# Patient Record
Sex: FEMALE | Race: WHITE | NOT HISPANIC OR LATINO
[De-identification: names, ages, dates, MRNs, and addresses within clinical notes are randomized per-mention and may not be internally consistent; named-entity substitution may affect disease eponyms.]

---

## 2018-02-13 ENCOUNTER — APPOINTMENT (OUTPATIENT)
Dept: ORTHOPEDIC SURGERY | Facility: CLINIC | Age: 63
End: 2018-02-13
Payer: COMMERCIAL

## 2018-02-13 VITALS
WEIGHT: 180 LBS | DIASTOLIC BLOOD PRESSURE: 85 MMHG | BODY MASS INDEX: 31.89 KG/M2 | HEART RATE: 84 BPM | HEIGHT: 63 IN | SYSTOLIC BLOOD PRESSURE: 130 MMHG

## 2018-02-13 DIAGNOSIS — Z96.643 PRESENCE OF ARTIFICIAL HIP JOINT, BILATERAL: ICD-10-CM

## 2018-02-13 PROCEDURE — 99204 OFFICE O/P NEW MOD 45 MIN: CPT

## 2018-02-13 PROCEDURE — 73564 X-RAY EXAM KNEE 4 OR MORE: CPT | Mod: 50

## 2018-02-13 PROCEDURE — 73522 X-RAY EXAM HIPS BI 3-4 VIEWS: CPT

## 2018-02-13 RX ORDER — ESCITALOPRAM OXALATE 10 MG/1
10 TABLET ORAL
Qty: 30 | Refills: 0 | Status: ACTIVE | COMMUNITY
Start: 2017-09-26

## 2018-02-13 RX ORDER — ALBUTEROL SULFATE 90 UG/1
108 (90 BASE) POWDER, METERED RESPIRATORY (INHALATION)
Qty: 1 | Refills: 0 | Status: ACTIVE | COMMUNITY
Start: 2017-09-24

## 2018-02-13 RX ORDER — AZITHROMYCIN 250 MG/1
250 TABLET, FILM COATED ORAL
Qty: 6 | Refills: 0 | Status: ACTIVE | COMMUNITY
Start: 2017-12-15

## 2018-02-13 RX ORDER — BENZONATATE 200 MG/1
200 CAPSULE ORAL
Qty: 15 | Refills: 0 | Status: ACTIVE | COMMUNITY
Start: 2017-09-24

## 2018-02-13 RX ORDER — AMOXICILLIN 875 MG/1
875 TABLET, FILM COATED ORAL
Qty: 20 | Refills: 0 | Status: ACTIVE | COMMUNITY
Start: 2017-09-28

## 2018-02-13 RX ORDER — FLUTICASONE PROPIONATE 50 UG/1
50 SPRAY, METERED NASAL
Qty: 16 | Refills: 0 | Status: ACTIVE | COMMUNITY
Start: 2017-08-26

## 2018-02-13 RX ORDER — ONABOTULINUMTOXINA 100 [USP'U]/1
100 INJECTION, POWDER, LYOPHILIZED, FOR SOLUTION INTRADERMAL; INTRAMUSCULAR
Qty: 1 | Refills: 0 | Status: ACTIVE | COMMUNITY
Start: 2017-10-16

## 2018-02-21 ENCOUNTER — APPOINTMENT (OUTPATIENT)
Dept: OPHTHALMOLOGY | Facility: CLINIC | Age: 63
End: 2018-02-21

## 2018-07-09 ENCOUNTER — APPOINTMENT (OUTPATIENT)
Dept: OPHTHALMOLOGY | Facility: CLINIC | Age: 63
End: 2018-07-09
Payer: COMMERCIAL

## 2018-07-09 DIAGNOSIS — H26.9 UNSPECIFIED CATARACT: ICD-10-CM

## 2018-07-09 DIAGNOSIS — H04.123 DRY EYE SYNDROME OF BILATERAL LACRIMAL GLANDS: ICD-10-CM

## 2018-07-09 DIAGNOSIS — G51.3 CLONIC HEMIFACIAL SPASM: ICD-10-CM

## 2018-07-09 PROCEDURE — 92004 COMPRE OPH EXAM NEW PT 1/>: CPT

## 2018-10-28 ENCOUNTER — TRANSCRIPTION ENCOUNTER (OUTPATIENT)
Age: 63
End: 2018-10-28

## 2019-03-19 ENCOUNTER — APPOINTMENT (OUTPATIENT)
Dept: ORTHOPEDIC SURGERY | Facility: CLINIC | Age: 64
End: 2019-03-19
Payer: COMMERCIAL

## 2019-03-19 DIAGNOSIS — M54.5 LOW BACK PAIN: ICD-10-CM

## 2019-03-19 PROCEDURE — 72100 X-RAY EXAM L-S SPINE 2/3 VWS: CPT

## 2019-03-19 PROCEDURE — 99213 OFFICE O/P EST LOW 20 MIN: CPT

## 2019-03-19 PROCEDURE — 73522 X-RAY EXAM HIPS BI 3-4 VIEWS: CPT

## 2019-03-19 NOTE — HISTORY OF PRESENT ILLNESS
[de-identified] : Pt is a 62 y/o female s/p R THR 2007 and L THR 2008 which were doing very well.  She states that she recently started to exercise regularly.  She tried using a rowing machine but stopped so that she could make sure she was not damaging her hips.\par She notes that she was running to catch a train about 1 month ago in the snow while holding a suitcase.  She had to step up to get on a train and afterwards she started to feel pain in the right hip.  She has intermittent pain when she stands for a long period of time.  The pain occasionally radiates down both legs.  She takes Advil for pain at night.

## 2019-03-19 NOTE — DISCUSSION/SUMMARY
[de-identified] : She is doing extremely well with her hip replacements.  She did have cobalt chrome and nickel levels done in May 2018.  The nickel was normal and there is only slight elevation in the cobalt and chromium which is very common with hip replacements.  At this time we will just follow her conservatively.  She will follow conservative back measures at this time for her back.  She is walking very well and function is excellent.  Return visit in 1 year.

## 2019-03-19 NOTE — PHYSICAL EXAM
[de-identified] : \par HIP EXAMINATION this patient is doing extremely well and her motion is excellent.  She has symmetric motion with flexion of 135, abduction 85, abduction 30 external rotation 80 and internal rotation 20.  She is walking very well.  She has bilateral Biomet Magnum metal-on-metal total hip replacements.  She feels strong and is walking well.\par \par He recently had some discomfort but was on her buttocks high up on the right consistent with low back pain it was after she was using some of the machines and working out.  Her deep tendon reflexes motor and sensory are symmetric.  She has good strength in all areas.  But clinically she does have some low back discomfort radiating to the top of the right buttocks and ilium. [de-identified] : Today an AP of the pelvis and lateral of the right and left hips were repeated.  She has bilateral metal-on-metal total hip replacement. They're in good position there are well fixed there is no evidence of any localized ostial lysis.\par \par An x-ray of her lumbar spine shows a slight list to the left and the L3 vertebrae hip slightly on L4 with degenerative changes and disc degeneration at L3-4 and L4-5.  Impression degenerative disc and lower lumbar spine.

## 2019-05-21 ENCOUNTER — APPOINTMENT (OUTPATIENT)
Dept: OPHTHALMOLOGY | Facility: CLINIC | Age: 64
End: 2019-05-21
Payer: COMMERCIAL

## 2019-05-21 PROCEDURE — 92083 EXTENDED VISUAL FIELD XM: CPT | Mod: NC

## 2019-05-21 PROCEDURE — 92012 INTRM OPH EXAM EST PATIENT: CPT

## 2019-05-28 ENCOUNTER — APPOINTMENT (OUTPATIENT)
Dept: OPHTHALMOLOGY | Facility: CLINIC | Age: 64
End: 2019-05-28
Payer: COMMERCIAL

## 2019-05-28 PROCEDURE — 64612 DESTROY NERVE FACE MUSCLE: CPT | Mod: RT

## 2019-08-02 ENCOUNTER — EMERGENCY (EMERGENCY)
Facility: HOSPITAL | Age: 64
LOS: 1 days | Discharge: HOME | End: 2019-08-02
Attending: EMERGENCY MEDICINE | Admitting: EMERGENCY MEDICINE
Payer: MEDICARE

## 2019-08-02 VITALS
HEART RATE: 79 BPM | RESPIRATION RATE: 15 BRPM | SYSTOLIC BLOOD PRESSURE: 119 MMHG | HEIGHT: 63 IN | DIASTOLIC BLOOD PRESSURE: 82 MMHG | WEIGHT: 175.05 LBS | OXYGEN SATURATION: 95 % | TEMPERATURE: 98 F

## 2019-08-02 VITALS
TEMPERATURE: 98 F | RESPIRATION RATE: 16 BRPM | SYSTOLIC BLOOD PRESSURE: 114 MMHG | OXYGEN SATURATION: 98 % | HEART RATE: 72 BPM | DIASTOLIC BLOOD PRESSURE: 66 MMHG

## 2019-08-02 LAB
ALBUMIN SERPL ELPH-MCNC: 3.4 G/DL — SIGNIFICANT CHANGE UP (ref 3.3–5)
ALP SERPL-CCNC: 68 U/L — SIGNIFICANT CHANGE UP (ref 40–120)
ALT FLD-CCNC: 17 U/L DA — SIGNIFICANT CHANGE UP (ref 10–45)
ANION GAP SERPL CALC-SCNC: 8 MMOL/L — SIGNIFICANT CHANGE UP (ref 5–17)
APTT BLD: 30.3 SEC — SIGNIFICANT CHANGE UP (ref 27.5–36.3)
AST SERPL-CCNC: 15 U/L — SIGNIFICANT CHANGE UP (ref 10–40)
BILIRUB SERPL-MCNC: 0.4 MG/DL — SIGNIFICANT CHANGE UP (ref 0.2–1.2)
BUN SERPL-MCNC: 29 MG/DL — HIGH (ref 7–23)
CALCIUM SERPL-MCNC: 9 MG/DL — SIGNIFICANT CHANGE UP (ref 8.4–10.5)
CHLORIDE SERPL-SCNC: 106 MMOL/L — SIGNIFICANT CHANGE UP (ref 96–108)
CO2 SERPL-SCNC: 27 MMOL/L — SIGNIFICANT CHANGE UP (ref 22–31)
CREAT SERPL-MCNC: 0.86 MG/DL — SIGNIFICANT CHANGE UP (ref 0.5–1.3)
GLUCOSE SERPL-MCNC: 99 MG/DL — SIGNIFICANT CHANGE UP (ref 70–99)
HCT VFR BLD CALC: 43.7 % — SIGNIFICANT CHANGE UP (ref 34.5–45)
HGB BLD-MCNC: 14.7 G/DL — SIGNIFICANT CHANGE UP (ref 11.5–15.5)
INR BLD: 0.94 RATIO — SIGNIFICANT CHANGE UP (ref 0.88–1.16)
LIDOCAIN IGE QN: 146 U/L — SIGNIFICANT CHANGE UP (ref 73–393)
MCHC RBC-ENTMCNC: 31.9 PG — SIGNIFICANT CHANGE UP (ref 27–34)
MCHC RBC-ENTMCNC: 33.6 GM/DL — SIGNIFICANT CHANGE UP (ref 32–36)
MCV RBC AUTO: 94.8 FL — SIGNIFICANT CHANGE UP (ref 80–100)
NRBC # BLD: 0 /100 WBCS — SIGNIFICANT CHANGE UP (ref 0–0)
NT-PROBNP SERPL-SCNC: 226 PG/ML — SIGNIFICANT CHANGE UP (ref 0–300)
PLATELET # BLD AUTO: 214 K/UL — SIGNIFICANT CHANGE UP (ref 150–400)
POTASSIUM SERPL-MCNC: 4.3 MMOL/L — SIGNIFICANT CHANGE UP (ref 3.5–5.3)
POTASSIUM SERPL-SCNC: 4.3 MMOL/L — SIGNIFICANT CHANGE UP (ref 3.5–5.3)
PROT SERPL-MCNC: 7.2 G/DL — SIGNIFICANT CHANGE UP (ref 6–8.3)
PROTHROM AB SERPL-ACNC: 10.5 SEC — SIGNIFICANT CHANGE UP (ref 10–12.9)
RBC # BLD: 4.61 M/UL — SIGNIFICANT CHANGE UP (ref 3.8–5.2)
RBC # FLD: 12.6 % — SIGNIFICANT CHANGE UP (ref 10.3–14.5)
SODIUM SERPL-SCNC: 141 MMOL/L — SIGNIFICANT CHANGE UP (ref 135–145)
TROPONIN I SERPL-MCNC: <.017 NG/ML — LOW (ref 0.02–0.06)
WBC # BLD: 5.09 K/UL — SIGNIFICANT CHANGE UP (ref 3.8–10.5)
WBC # FLD AUTO: 5.09 K/UL — SIGNIFICANT CHANGE UP (ref 3.8–10.5)

## 2019-08-02 PROCEDURE — 84484 ASSAY OF TROPONIN QUANT: CPT

## 2019-08-02 PROCEDURE — 80053 COMPREHEN METABOLIC PANEL: CPT

## 2019-08-02 PROCEDURE — 93010 ELECTROCARDIOGRAM REPORT: CPT

## 2019-08-02 PROCEDURE — 85610 PROTHROMBIN TIME: CPT

## 2019-08-02 PROCEDURE — 71045 X-RAY EXAM CHEST 1 VIEW: CPT

## 2019-08-02 PROCEDURE — 83690 ASSAY OF LIPASE: CPT

## 2019-08-02 PROCEDURE — 71250 CT THORAX DX C-: CPT

## 2019-08-02 PROCEDURE — 36415 COLL VENOUS BLD VENIPUNCTURE: CPT

## 2019-08-02 PROCEDURE — 71250 CT THORAX DX C-: CPT | Mod: 26

## 2019-08-02 PROCEDURE — 85730 THROMBOPLASTIN TIME PARTIAL: CPT

## 2019-08-02 PROCEDURE — 93005 ELECTROCARDIOGRAM TRACING: CPT

## 2019-08-02 PROCEDURE — 85027 COMPLETE CBC AUTOMATED: CPT

## 2019-08-02 PROCEDURE — 71045 X-RAY EXAM CHEST 1 VIEW: CPT | Mod: 26

## 2019-08-02 PROCEDURE — 99285 EMERGENCY DEPT VISIT HI MDM: CPT

## 2019-08-02 PROCEDURE — 83880 ASSAY OF NATRIURETIC PEPTIDE: CPT

## 2019-08-02 PROCEDURE — 99284 EMERGENCY DEPT VISIT MOD MDM: CPT | Mod: 25

## 2019-08-02 RX ORDER — ESCITALOPRAM OXALATE 10 MG/1
15 TABLET, FILM COATED ORAL
Qty: 0 | Refills: 0 | DISCHARGE

## 2019-08-02 RX ORDER — LISDEXAMFETAMINE DIMESYLATE 70 MG/1
1 CAPSULE ORAL
Qty: 0 | Refills: 0 | DISCHARGE

## 2019-08-02 NOTE — ED PROVIDER NOTE - PROGRESS NOTE DETAILS
ARNOLDO Stubbs: labs reviewed, chest CT results reviewed-- pericardial cyst noted. Patient stable for dc with instructions to f/u with her cardiologist

## 2019-08-02 NOTE — ED PROVIDER NOTE - ATTENDING CONTRIBUTION TO CARE
Amie with ARNOLDO Telles. 63 y/o F with PMH of borderline HLD (not on meds) presents to the ED with c/o constant dull, non radiating, nonexertional right sided CP x 9pm last night, worse with movement and deep breathes. Reports she was with her  on 3 days ago doing arm exercises, she felt sore the following day and noticed her chest was achy yesterday. Reports she took ibuprofen this morning which resolved the pain. Denies associated SOB, palpitations, leg swelling, f/c, n/v, prolonged immobilization or all other complaints. Last cardiac w/u was 3 yrs ago, was normal per patient. Patient currently asymptomatic. Likely msk chest wall pain. will check 1 set of cardiac labs, CXR, reassess. Labs WNL. CXR with density in right side lung field. CT chest performed to assess. Pericardial cyst noted. Pt notes she believes she had this before (she was at [facility] ) 7 years ago when it was discovered. She will take these results to her PCP who will compare. Stable for dc.     I performed a face to face bedside interview with patient regarding history of present illness, review of symptoms and past medical history. I completed an independent physical exam.  I have discussed the patient's plan of care with Physician Assistant (PA). I agree with note as stated above, having amended the EMR as needed to reflect my findings.   This includes History of Present Illness, HIV, Past Medical/Surgical/Family/Social History, Allergies and Home Medications, Review of Systems, Physical Exam, and any Progress Notes during the time I functioned as the attending physician for this patient.

## 2019-08-02 NOTE — ED PROVIDER NOTE - CARE PROVIDER_API CALL
Jesus Alberto Chin)  Cardiology  [address], [address]  [place] [address]  Phone: [phone]  Fax: [phone]  Follow Up Time:

## 2019-08-02 NOTE — ED PROVIDER NOTE - OBJECTIVE STATEMENT
65 y/o F with PMH of borderline HLD (not on meds) presents to the ED with c/o constant dull, non radiating, nonexertional right sided CP x 9pm last night, worse with movement and deep breathes. Reports she was with her  on 3 days ago doing arm exercises, she felt sore the following day and noticed her chest was achy yesterday. Reports she took ibuprofen this morning which resolved the pain. Denies associated SOB, palpitations, leg swelling, f/c, n/v, prolonged immobilization or all other complaints. Last cardiac w/u was 3 yrs ago, was normal per patient. 65 y/o F with PMH of borderline HLD (not on meds) presents to the ED with c/o constant dull, non radiating, nonexertional right sided CP x 9pm last night, worse with movement and deep breathes. Reports she was with her  on 3 days ago doing arm exercises, she felt sore the following day and noticed her chest was achy yesterday. Reports she took ibuprofen this morning which resolved the pain. Denies associated SOB, swelling, palpitations, leg swelling, f/c, n/v, prolonged immobilization or all other complaints. Last cardiac w/u was 3 yrs ago, was normal per patient.

## 2019-08-02 NOTE — ED PROVIDER NOTE - CLINICAL SUMMARY MEDICAL DECISION MAKING FREE TEXT BOX
65 y/o F with PMH of borderline HLD (not on meds) presents to the ED with c/o constant dull, non radiating, nonexertional right sided CP x 9pm last night, worse with movement and deep breathes. Reports she was with her  on 3 days ago doing arm exercises, she felt sore the following day and noticed her chest was achy yesterday. Reports she took ibuprofen this morning which resolved the pain. Denies associated SOB, palpitations, leg swelling, f/c, n/v, prolonged immobilization or all other complaints. Last cardiac w/u was 3 yrs ago, was normal per patient. Patient currently asymptomatic. Likely msk chest wall pain. will check 1 set of cardiac labs, CXR, reassess

## 2019-08-02 NOTE — ED ADULT NURSE NOTE - OBJECTIVE STATEMENT
started having cp last night at 9 pm, no other symptoms noted at that time, pain is located right side of the chest, not associated with dyspnea, diaphoresis. she did wake up this am and was noted to be sweaty on her back. She took 2 Advil this am with improvement in the pain. she also stated she resumed exercise this week after a long time off.

## 2019-09-10 ENCOUNTER — APPOINTMENT (OUTPATIENT)
Dept: OPHTHALMOLOGY | Facility: CLINIC | Age: 64
End: 2019-09-10
Payer: MEDICARE

## 2019-09-10 ENCOUNTER — NON-APPOINTMENT (OUTPATIENT)
Age: 64
End: 2019-09-10

## 2019-09-10 PROBLEM — Z78.9 OTHER SPECIFIED HEALTH STATUS: Chronic | Status: ACTIVE | Noted: 2019-08-02

## 2019-09-10 PROCEDURE — 64612 DESTROY NERVE FACE MUSCLE: CPT | Mod: LT

## 2019-12-03 ENCOUNTER — NON-APPOINTMENT (OUTPATIENT)
Age: 64
End: 2019-12-03

## 2019-12-03 ENCOUNTER — APPOINTMENT (OUTPATIENT)
Dept: OPHTHALMOLOGY | Facility: CLINIC | Age: 64
End: 2019-12-03
Payer: MEDICARE

## 2019-12-03 PROCEDURE — 64612 DESTROY NERVE FACE MUSCLE: CPT | Mod: LT

## 2020-03-24 ENCOUNTER — APPOINTMENT (OUTPATIENT)
Dept: OPHTHALMOLOGY | Facility: CLINIC | Age: 65
End: 2020-03-24

## 2020-05-20 ENCOUNTER — APPOINTMENT (OUTPATIENT)
Dept: ORTHOPEDIC SURGERY | Facility: CLINIC | Age: 65
End: 2020-05-20
Payer: MEDICARE

## 2020-05-20 VITALS — TEMPERATURE: 98.9 F

## 2020-05-20 DIAGNOSIS — M25.562 PAIN IN LEFT KNEE: ICD-10-CM

## 2020-05-20 PROCEDURE — 73522 X-RAY EXAM HIPS BI 3-4 VIEWS: CPT

## 2020-05-20 PROCEDURE — 99213 OFFICE O/P EST LOW 20 MIN: CPT

## 2020-05-20 PROCEDURE — 73564 X-RAY EXAM KNEE 4 OR MORE: CPT | Mod: 50

## 2020-05-20 NOTE — PHYSICAL EXAM
[de-identified] : \par HIP EXAMINATION this patient is doing extremely well.  She does have she does bilateral Biomet Magnum metal-on-metal total hip replacements.  They have done very well.  She has excellent motion.  She walks without a limp.  She has symmetric motion with flexion of 135, abduction 85, abduction 35 external rotation 85 and internal rotation 25. \par \par Positive for weight on a diet recently and feels better as far as her knees but because she is very active she does get some discomfort in her knees her discomfort is more toward her patellofemoral joints.  She also has some tenderness possibly toward the patella tendon.  She has good medial lateral and anterior posterior stability bilaterally in each knee has 0 to 135 degrees of flexion she has no major effusion she has no Baker's cyst and Steinmann test is negative over the medial lateral joint line but she has more of an anterior aching with mild discomfort and crepitus with compression of the patella. [de-identified] : Hips    she has bilateral metal-on-metal total hip replacement. They're in good position there are well fixed there is no evidence of any localized ostial lysis.\par \par 4 views of her right and left knee show medial lateral joint lines are maintained her patella tracks well no major arthritis can be seen no osteophytes or major loose bodies are present.  These x-rays do not rule out early degeneration in the menisci or some chondromalacia or early degenerative changes at the patellofemoral joint.

## 2020-05-20 NOTE — HISTORY OF PRESENT ILLNESS
[de-identified] : Patient is a 64 y/o female s/p R THR 2007 and L THR 2008 which are doing very well. \par She has no pain or complaints regarding her hips.\par She is, however, experiencing right knee pain > left knee pain at this time.\par She was seeing an acupuncturist for this knee pain since her knee pains worsened in the fall 2019, which helped.\par Also, patient says she has lost about 20 pounds, intentionally, beginning this year, which also has helped take some pressure off her knees.\par She says especially over the last 1-2 weeks, her knees have bothered her more with physical activities and exercising.\par She has only tried topical biofreeze for this pain, which helps somewhat.

## 2020-05-20 NOTE — DISCUSSION/SUMMARY
[de-identified] : Her total hip replacements are doing very well.  The discomfort in her knee could be because of her exercises and may be a little anterior knee pain mild syndrome of the patellofemoral joint however if she has more pain MRI will be considered at this time she would just like to try an oral anti-inflammatory and she will try Aleve 1 to 2 tablets twice a day return visit in 1 year or sooner if her symptoms increase.

## 2022-02-07 ENCOUNTER — APPOINTMENT (OUTPATIENT)
Dept: ORTHOPEDIC SURGERY | Facility: CLINIC | Age: 67
End: 2022-02-07
Payer: MEDICARE

## 2022-02-07 VITALS
DIASTOLIC BLOOD PRESSURE: 91 MMHG | SYSTOLIC BLOOD PRESSURE: 154 MMHG | BODY MASS INDEX: 30.12 KG/M2 | HEART RATE: 86 BPM | WEIGHT: 170 LBS | HEIGHT: 63 IN

## 2022-02-07 DIAGNOSIS — M22.41 CHONDROMALACIA PATELLAE, RIGHT KNEE: ICD-10-CM

## 2022-02-07 DIAGNOSIS — M25.561 PAIN IN RIGHT KNEE: ICD-10-CM

## 2022-02-07 PROCEDURE — 99214 OFFICE O/P EST MOD 30 MIN: CPT

## 2022-02-07 PROCEDURE — 73522 X-RAY EXAM HIPS BI 3-4 VIEWS: CPT

## 2022-02-07 PROCEDURE — 72100 X-RAY EXAM L-S SPINE 2/3 VWS: CPT

## 2022-02-07 PROCEDURE — 73562 X-RAY EXAM OF KNEE 3: CPT | Mod: 50

## 2022-02-07 NOTE — HISTORY OF PRESENT ILLNESS
[de-identified] : Patient is a 67 y/o female s/p R THR 2007 and L THR 2008 which are doing very well. \par She has no pain or complaints regarding her hips.\par She is, however, experiencing some lower back pain and right knee pain at this time.\par These have been present intermittently over the last few years, but more so after slipping and falling on ice x 1 week ago.\par She is able to walk and do her ADLs just fine, but has discomfort with doing excessive activity.\par She has not conservatively treated her pain thus far.

## 2022-02-07 NOTE — DISCUSSION/SUMMARY
[de-identified] : Patient's total hip replacements continue to do extremely well and are functioning well with excellent motion.  Her back does show degenerative changes at L5-S1 but she is managed well on conservative measures and her right knee is giving her some discomfort at the patellofemoral joint as well as at the medial joint line.  With her fall she may have sustained a tear of her medial meniscus however her symptoms are not enough at this time to warrant further treatment other than conservative measures and an oral anti-inflammatory agent of which she likes ibuprofen.  If her knee pain increases we will get an MRI.  She is in agreement with the plan and will return in 3 months if necessary.

## 2022-02-07 NOTE — PHYSICAL EXAM
[de-identified] : This patient has had bilateral total hip replacements as stated under previous exams.  Also has had some mild back pain throughout the years but this may have increased recently as well as pain in her right knee after recent fall.  The patients gait is WNL. The patient has satisfactory  balance and can stand on toes and heels.\par \par Spine - deep tendon reflexes are symmetric. Motor and sensory are symmetric.  She has good muscle strength in the ankles.  No radicular symptoms at this time she has some mild low back discomfort.\par \par \par \par Circulation appears satisfactory with pedal pulses present.  There is no major edema in the lower legs. No skin tenderness or increased temperature. No major varicosities.\par \par HIP EXAMINATION the abduction and abduction as well as rotation measurements were taken with the hip in flexion.\par She has bilateral Biomet Magnum and to a metal-on-metal total hip replacements.  She has had no pain in her hips.\par \par Motion\par There is symmetric motion with flexion 135 degrees, abduction 80 degrees, adduction 30 degrees, external rotation 80 degrees, internal rotation 20 degrees.\par \par The hips have good range of motion. There is good strength across the hips. There is no crepitus in either hip. The alignment of the hips is normal.\par \par \par KNEE EXAMINATION\par \par Motion\par Right Knee has 0 to 135 degrees of motion with good medial  lateral and anterior posterior stability.  She does have some discomfort with palpation over the medial joint line.  The Steinmann test shows discomfort medial joint line but there is no clicks.  She has no significant effusion and no Baker's cyst.  She has good medial lateral and anterior posterior stability.  She has slight tenderness with compression of the patella. \par \par          \par Left  Knee   has 0 to 135 degrees of motion with good medial lateral and anterior posterior stability.  There is no major effusion there is no Baker's cyst.  There is no significant patellofemoral crepitus.  The patient has satisfactory strength across the knee.            \par \par Ankle and foot examination\par Of the ankle has normal motion.  There is normal ankle stability.  The patient has no major abnormalities of the foot.\par \par \par \par  [de-identified] : AP of the pelvis and lateral of the right left hip show bilateral Biomet M2 a metal-on-metal total hip replacements they are both in excellent position there is no evidence of any localized osteolysis and there is no soft tissue abnormalities that can be seen by x-ray.\par \par An AP and lateral of the patient's of the spine shows a slight curvature with a convexity to the left in the lumbar area some degenerative changes and narrowing at L5-S1 but disc spaces generally remained in the remainder of the lumbar area and foramina down to L4-5 appear to be open.\par \par 3 views were done of both the right and left knees.  These do show on the standing AP view joint spaces maintained medially and laterally there is no lateral no narrowing of the medial compartment.  The lateral view show that the joint spaces are also maintained however there does appear to be slight degenerative changes at the patellofemoral joint on the right there may be some narrowing is seen on the skyline view except it is not a true tangential.  Impression knees normally aligned and joint spaces maintained but this does not rule out a degenerative meniscal tear or chondromalacia or patellofemoral wear.

## 2023-03-25 ENCOUNTER — NON-APPOINTMENT (OUTPATIENT)
Age: 68
End: 2023-03-25

## 2023-06-07 ENCOUNTER — APPOINTMENT (OUTPATIENT)
Dept: ORTHOPEDIC SURGERY | Facility: CLINIC | Age: 68
End: 2023-06-07
Payer: MEDICARE

## 2023-06-07 DIAGNOSIS — Z96.642 PRESENCE OF LEFT ARTIFICIAL HIP JOINT: ICD-10-CM

## 2023-06-07 DIAGNOSIS — Z96.641 PRESENCE OF RIGHT ARTIFICIAL HIP JOINT: ICD-10-CM

## 2023-06-07 DIAGNOSIS — M51.37 OTHER INTERVERTEBRAL DISC DEGENERATION, LUMBOSACRAL REGION: ICD-10-CM

## 2023-06-07 PROCEDURE — 99213 OFFICE O/P EST LOW 20 MIN: CPT

## 2023-06-07 PROCEDURE — 73522 X-RAY EXAM HIPS BI 3-4 VIEWS: CPT

## 2023-06-07 NOTE — PHYSICAL EXAM
[de-identified] : She continues to do extremely well as far as her bilateral hip replacements.  She does have bilateral Biomet metal-on-metal magnum total hip replacements.  She does exercise class she is extremely active and has done physical therapy in the past all of which is easier for her to do as far as her hips.  She does get some mild back pain intermittently and at this time she has had some pain around her left ilium sometimes radiating toward her leg but this does not give her any discomfort as far as her hips her deep tendon reflexes motor and sensory remain symmetric.\par \par The patient walks without any evidence of a limp.  This patient has had bilateral total hip replacements as stated under previous exams.  Also has had some mild back pain throughout the years but this may have increased recently as well as pain in her right knee after recent fall.  The patients gait is WNL. The patient has satisfactory  balance and can stand on toes and heels.\par \par Spine - deep tendon reflexes are symmetric. Motor and sensory are symmetric.  She has good muscle strength in the ankles.  No radicular symptoms at this time she has some mild low back discomfort.\par \par \par \par Circulation appears satisfactory with pedal pulses present.  There is no major edema in the lower legs. No skin tenderness or increased temperature. No major varicosities.\par \par HIP EXAMINATION the abduction and abduction as well as rotation measurements were taken with the hip in flexion.\par She has bilateral Biomet Magnum and to a metal-on-metal total hip replacements.  She has had no pain in her hips.\par There is symmetric motion with flexion 135 degrees, abduction 80 degrees, adduction 30 degrees, external rotation 80 degrees, internal rotation 20 degrees.  There is no evidence of any pain around her groin she has good strength across her hips there is no crepitus in either hip the alignment of her hips is symmetric and normal.  \par \par  [de-identified] : AP of the pelvis and lateral of the right left hip show bilateral Biomet M2a metal-on-metal total hip replacements.  The position in both is excellent there is no evidence of any bone loss or change in position or osteolysis. \par

## 2023-06-07 NOTE — HISTORY OF PRESENT ILLNESS
[de-identified] : Patient is a 67 y/o female s/p R THR 2007 and L THR 2008 which are doing very well. \par She has no pain or complaints regarding her hips.

## 2023-06-07 NOTE — DISCUSSION/SUMMARY
[de-identified] : Patient's total hip replacements continue to do extremely well and are functioning well with excellent motion.  Her back does show degenerative changes at L5-S1 but she is managed well on conservative measures and her right knee is giving her some discomfort at the patellofemoral joint as well as at the medial joint line.  With her fall she may have sustained a tear of her medial meniscus however her symptoms are not enough at this time to warrant further treatment other than conservative measures and an oral anti-inflammatory agent of which she likes ibuprofen.  If her knee pain increases we will get an MRI.  She is in agreement with the plan and will return in 3 months if necessary.

## 2023-08-04 ENCOUNTER — NON-APPOINTMENT (OUTPATIENT)
Age: 68
End: 2023-08-04

## 2023-08-04 ENCOUNTER — APPOINTMENT (OUTPATIENT)
Dept: OPHTHALMOLOGY | Facility: CLINIC | Age: 68
End: 2023-08-04
Payer: MEDICARE

## 2023-08-04 PROCEDURE — 92136 OPHTHALMIC BIOMETRY: CPT

## 2023-08-04 PROCEDURE — 92004 COMPRE OPH EXAM NEW PT 1/>: CPT

## 2023-10-10 ENCOUNTER — APPOINTMENT (OUTPATIENT)
Dept: PULMONOLOGY | Facility: CLINIC | Age: 68
End: 2023-10-10
Payer: MEDICARE

## 2023-10-10 PROCEDURE — 90791 PSYCH DIAGNOSTIC EVALUATION: CPT

## 2023-10-24 ENCOUNTER — APPOINTMENT (OUTPATIENT)
Dept: PULMONOLOGY | Facility: CLINIC | Age: 68
End: 2023-10-24
Payer: MEDICARE

## 2023-10-24 PROCEDURE — 90837 PSYTX W PT 60 MINUTES: CPT

## 2023-11-16 ENCOUNTER — APPOINTMENT (OUTPATIENT)
Dept: PULMONOLOGY | Facility: CLINIC | Age: 68
End: 2023-11-16
Payer: MEDICARE

## 2023-11-16 PROCEDURE — 90837 PSYTX W PT 60 MINUTES: CPT

## 2023-12-05 ENCOUNTER — APPOINTMENT (OUTPATIENT)
Dept: PULMONOLOGY | Facility: CLINIC | Age: 68
End: 2023-12-05
Payer: MEDICARE

## 2023-12-05 PROCEDURE — 90837 PSYTX W PT 60 MINUTES: CPT

## 2023-12-26 ENCOUNTER — APPOINTMENT (OUTPATIENT)
Dept: PULMONOLOGY | Facility: CLINIC | Age: 68
End: 2023-12-26

## 2024-01-18 NOTE — ED PROVIDER NOTE - NSFOLLOWUPINSTRUCTIONS_ED_ALL_ED_FT
Spontaneous, unlabored and symmetrical
Follow up your test results with your cardiologist as discussed     Take over the counter Motrin as needed for pain     Stay hydrated    Return to the ER if your symptoms worsen, high fevers, severe pain or for any other medical emergencies  ******************************************    Chest Pain    Chest pain can be caused by many different conditions which may or may not be dangerous. Causes include heartburn, lung infections, heart attack, blood clot in lungs, skin infections, strain or damage to muscle, cartilage, or bones, etc. In addition to a history and physical examination, an electrocardiogram (ECG) or other lab tests may have been performed to determine the cause of your chest pain. Follow up with your primary care provider or with a cardiologist as instructed.     SEEK IMMEDIATE MEDICAL CARE IF YOU HAVE ANY OF THE FOLLOWING SYMPTOMS: worsening chest pain, coughing up blood, unexplained back/neck/jaw pain, severe abdominal pain, dizziness or lightheadedness, fainting, shortness of breath, sweaty or clammy skin, vomiting, or racing heart beat. These symptoms may represent a serious problem that is an emergency. Do not wait to see if the symptoms will go away. Get medical help right away. Call 911 and do not drive yourself to the hospital.

## 2024-01-31 ENCOUNTER — APPOINTMENT (OUTPATIENT)
Dept: PULMONOLOGY | Facility: CLINIC | Age: 69
End: 2024-01-31
Payer: MEDICARE

## 2024-01-31 PROCEDURE — 90837 PSYTX W PT 60 MINUTES: CPT | Mod: 2W

## 2024-02-01 NOTE — ASSESSMENT
[FreeTextEntry1] : Time: 4:05-4:59 pm Pt reports sleep got off track when sick with Covid in December and other holiday and new year events. Describes that during initial sessions, she was having more difficulty waking every two hours but now sleeping, but not on a regular schedule. Reports that she has begun a 12 week eating management program at [facility].  Sleep logs show bedtime 11 pm to 2 am, SOL 0- "forever", ave SOL ~ 10 mins and not necessarily quicker with later bedtimes, 0-2 awakenings WASO 15-90 mins, ave WASO ~25 mins, final waketime 6-10 am, ave waketime 8:30-9 am. Reports she is not "availing" herself of some of the CBTi recommendations: lightbox, daytime light exposure, sometimes procrastinating bedtime but is exercising more regularly. Validated accomplishments and reviewed rationale and protcol for CBTi for DSPS. Discussed that when she takes melatonin @ 10:30 pm could be the prompt for winding down to go to sleep but to avoid getting into bed until 12:30 pm and get out of bed (set aarm ) 8:30 am. Discussed "quasi-sleep" in the middle of the night and to get out of bed if >30 mins, do something relaxing, not simulating and get back into bed after ~30-60 mins. Document with sleep logs. f/u 3-4 wks

## 2024-02-01 NOTE — HISTORY OF PRESENT ILLNESS
[FreeTextEntry1] : Ms. 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 She describes "procrastinating" getting into bed and that there is always something more for her to do. As a child, she recalls that her mother needed to wake her for school and that she was usually late. She often pulled "all nighters" to get work done and would go grocery shopping at 4 am. 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 Preferred bedtime when younger was 3-4 am but forced to go to bed earlier for school and work schedules.   Typical SW schedule is bed between midnight and 1:30 am, reading, listening to podcasts, sleep latency 1 min to 1 hour, usually awakens ~ 3 am, nocturia, up for a few hours and then falls back asleep and gets up between 8-11 am. She estimates getting 5-6 hours sleep. She would like to get up earlier for a [employer] class @ 7am but is finding it difficult with her current schedule. She has mild daytime sleepiness (ESS=8- may fall asleep during sedentary conditions). She drinks 2 cups of coffee, denies ETOH or other substances. She takes Vyvanse, Lexapro 15 mg and Lipitor. Impression: Mild Anxiety Disorder, Delayed Sleep Wake Phase Disorder, Inadequate Sleep Hygiene CBTi recommendations: discussed txtmt for DSPS using melatonin in divided doses several hours prior to bed, strict SW schedule and morning light exposure. Update: Pt report and sleep logs show more consolidated and improved sleep variables with adherence to CBTi recommendations for DSPS: bedtime routine using melatonin 2 hours prior to bedtime at 1 am and waketime 8:30-9 am.

## 2024-02-01 NOTE — PHYSICAL EXAM
[Impaired Insight] : insight and judgment were intact [Affect] : the affect was normal [Mood] : the mood was normal

## 2024-02-01 NOTE — REASON FOR VISIT
[Home] : at home, [unfilled] , at the time of the visit. [Other Location: e.g. Home (Enter Location, City,State)___] : at [unfilled] [Patient] : the patient [Follow-Up] : a follow-up visit [FreeTextEntry1] : anxiety, insomnia, DSPS, ADHD

## 2024-02-27 ENCOUNTER — APPOINTMENT (OUTPATIENT)
Dept: PULMONOLOGY | Facility: CLINIC | Age: 69
End: 2024-02-27
Payer: MEDICARE

## 2024-02-27 PROCEDURE — 90837 PSYTX W PT 60 MINUTES: CPT | Mod: 2W

## 2024-02-27 NOTE — REASON FOR VISIT
[Follow-Up] : a follow-up visit [Home] : at home, [unfilled] , at the time of the visit. [Patient] : the patient [Other Location: e.g. Home (Enter Location, City,State)___] : at [unfilled] [FreeTextEntry1] : anxiety, insomnia

## 2024-02-27 NOTE — ASSESSMENT
[FreeTextEntry1] : Time: 1:04-1:58 pm Reports "in general, things are improving." States "I use to be anxious about how much sleep I'd get... a lot of time it was 4 hours." And that now she usually gets 6-7 hours and only occasionally 4-5 hours. States she has the "tools" to make her sleep better and does not catastophize about a poor night of sleep. Recommendations are challenging to follow when at her boyfriend's place: since he gets into bed earlier and doesn't feel comfortable getting out of bed if awake at night, gets woken up by boyfriend's alarm at 7 am. She got out of bed one night snacked and browsed a cookbook and was able to fall back to sleep within 30 mins. Traveling 1-2 times per month that also affects sleep.  Sleep logs show bedtime 12:30-1 am sleep latency 0-10 mins, 0-3 awakenings, WASO 0-4 hours, final waketime 7:30-9 am.  Discussed snacking behavior - helps "turn brain chatter off". Reviewed history of DSPS and insomnia- parents quit putting her to sleep because it took too long. She was on her own schedule. Issues were not addressed in the house, she recalls sleeping with a edmondson light on. Parents were in general, uninvolved with her when younger. 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 Ordered [employer] light box. Pt mentioned that she feels "edgy and restless" in middle of night wakings- unclear if restless legs- she doesn't get up to walk but does move them- states it helps distract her but isn't sure if it's because she is relieving a restless leg sensation. Does not noticed this sensation during the day. She will monitor this sensation closer and document on logs. Reviewed CBTi and DSPS protocol. Pt has f/u with cardiologist and will request if blood work can include ferritin levels. f/u 3-4 wks.

## 2024-02-27 NOTE — HISTORY OF PRESENT ILLNESS
[FreeTextEntry1] : Ms. 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 She describes "procrastinating" getting into bed and that there is always something more for her to do. As a child, she recalls that her parents "gave up" trying to put her to sleep and she would sleep on her own schedule, often hard to wake and late for school. In college, she often pulled "all nighters" to get work done and would go grocery shopping at 4 am. 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 Preferred bedtime when younger was 3-4 am but forced to go to bed earlier for school and work schedules. Typical SW schedule is reads, listens to podcasts in bed ~midnight - 1:30 am, sleep latency 1 min to 1 hour, awakens ~ 3 am, nocturia, can take up to a few hours to fall back to sleep, up 8-11 am. Estimates 5-6 hours sleep. Mild daytime sleepiness (ESS=8- may fall asleep during sedentary conditions), naps 1-2 times per week. Drinks 2 cups coffee, denies ETOH or other substances. She takes Vyvanse, Lexapro 15 mg and Lipitor. Impression: Mild Anxiety Disorder, Delayed Sleep Wake Phase Disorder, Inadequate Sleep Hygiene CBTi and DSPS recommendations: melatonin in divided doses several hours prior to bed, strict SW schedule and morning light exposure. Update: Pt report and sleep logs show more consolidated and improved sleep variables with adherence to CBTi recommendations for DSPS: bedtime routine melatonin, bedtime at 1 am and waketime 8:30-9 am, light exposure upon awakening.

## 2024-03-21 ENCOUNTER — APPOINTMENT (OUTPATIENT)
Dept: PULMONOLOGY | Facility: CLINIC | Age: 69
End: 2024-03-21
Payer: MEDICARE

## 2024-03-21 PROCEDURE — 90837 PSYTX W PT 60 MINUTES: CPT

## 2024-03-26 NOTE — PHYSICAL EXAM
[General Appearance - Well Developed] : well developed [Normal Appearance] : normal appearance [Well Groomed] : well groomed [General Appearance - In No Acute Distress] : no acute distress [General Appearance - Well Nourished] : well nourished [Impaired Insight] : insight and judgment were intact [Oriented To Time, Place, And Person] : oriented to person, place, and time [Mood] : the mood was normal [Affect] : the affect was normal [Memory Recent] : recent memory was not impaired [FreeTextEntry1] : pleasant demeanor, articulate, tendency to lose focus at times.

## 2024-03-26 NOTE — ASSESSMENT
[FreeTextEntry1] : Time: 1:00-1:53 pm Reports using light box inconsistently- initially experienced positive mood/sleep change- felt more awake in AM. Notices different patterns if sleeping at home or at boyfriend's house (Fri, Sat, Sun)- easier to comply with schedule and stimulus control at home, more apt to doze when gets into bed earlier at boyfriend's and then problems falling asleep, increased snacking/binging at home, difficulty with STD time change (advance). Past week experienced increased sleep maintenance issues.   Three-week sleep logs show: bed 12:30-1 am, SOL 0-40 mins (shorter SOL with later bedtimes), 0-3 awakenings, WASO  mins, final waketime 7:30-9 am, TST 4-8 hrs, ave TST 5.95 hrs.  Naps 30-90 mins 3-4x/wk. Light box ~6/7 mornings. Describes that she doesn't always put the shades down at night.  Reviewed CBTi rationale and protocols- avoid napping >4 pm and <30 mins, put shades down before getting into bed, delay bedtime till 1 am, waketime regular at 8 am, light box or sun exposure in first hour after awakening. Monitor snacking behavior. f/u 4 wks.

## 2024-03-26 NOTE — HISTORY OF PRESENT ILLNESS
[FreeTextEntry1] : Ms. 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 She describes "procrastinating" getting into bed and that there is always something more for her to do. As a child, she recalls that her parents "gave up" trying to put her to sleep and she would sleep on her own schedule, often hard to wake and late for school. In college, she often pulled "all nighters" to get work done and would go grocery shopping at 4 am. 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 Preferred bedtime when younger was 3-4 am but forced to go to bed earlier for school and work schedules. Typical SW schedule: reads, listens to podcasts in bed ~midnight - 1:30 am, sleep latency 1 min to 1 hour, awakens ~ 3 am, nocturia, can take up to a few hours to fall back to sleep, up 8-11 am. Estimates 5-6 hours sleep. Mild daytime sleepiness (ESS=8- may fall asleep during sedentary conditions), naps 1-2 times per week. Drinks 2 cups coffee, denies ETOH or other substances. She takes Vyvanse, Lexapro 15 mg and Lipitor. Impression: Mild Anxiety Disorder, Delayed Sleep Wake Phase Disorder, Inadequate Sleep Hygiene CBTi and DSPS recommendations: melatonin in divided doses several hours prior to bed, strict SW schedule and morning light exposure. Update: Pt report and sleep logs show more consolidated and improved sleep variables with adherence to CBTi recommendations for DSPS: bedtime routine melatonin, bedtime at 1 am and waketime 8:30-9 am, light exposure upon awakening.

## 2024-04-18 ENCOUNTER — APPOINTMENT (OUTPATIENT)
Dept: PULMONOLOGY | Facility: CLINIC | Age: 69
End: 2024-04-18

## 2024-04-18 ENCOUNTER — APPOINTMENT (OUTPATIENT)
Dept: PULMONOLOGY | Facility: CLINIC | Age: 69
End: 2024-04-18
Payer: MEDICARE

## 2024-04-18 PROCEDURE — 90837 PSYTX W PT 60 MINUTES: CPT

## 2024-04-18 NOTE — ASSESSMENT
[FreeTextEntry1] : Time: 1:07-2:03 pm Reports that she contracted shingles last weekend- noticed by right side rash and fatigue- contacted PCP, taking Acyclovir since Sunday, 4/14. States "mild case"- some itching, fatigue but otherwise ok. Was in [place], [place] for wedding from 4/4 returning on 4/11, wondered if the stress of packing might be related to lizzy shingles. Had no difficulty adapting to delayed time zone and slept well but had difficulty coming east and advancing bedtime. Reinforced DSPS rationale.  One week sleep log during trip to and from [place] shows melatonin use ~ 3 hours prior to bedtime, varied bedtime 11 pm to 3:30 am, SOL 10 mins to 1.5 hours, 0-3 awakenings, TST 2-8 hours. Naps ~60 mins ~1-2 times per week. Discussed irregular SW and factors affecting (e.g., jet lag, shingles). Pt aware of patterns of procrastination and finds she sleeps and feels best when she keeps a regular and strict SW schedule. She filled out an "AM" and "PM" index card and has set alarms to follows routines for both times. Did better with light exposure in [place], natural sun-putting blinds up upon awakening.  Mood euthymic. Recommendations: keep AM and PM routines, follow CBTi and DSPS recommendations: melatonin ~10 pm, 12:30-7:30 am, morning light exposure and activity- [employer] when feeling better, naps <30 mins and before 4 pm.  F/u 3 wks.

## 2024-04-18 NOTE — HISTORY OF PRESENT ILLNESS
[FreeTextEntry1] : Ms. 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 She describes "procrastinating" getting into bed and that there is always something more for her to do. As a child, she recalls that her parents "gave up" trying to put her to sleep and she would sleep on her own schedule, often hard to wake and late for school. In college, she often pulled "all nighters" to get work done and would go grocery shopping at 4 am. 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 Preferred bedtime when younger was 3-4 am but forced to go to bed earlier for school and work schedules. Typical SW schedule: reads, listens to podcasts in bed ~midnight - 1:30 am, sleep latency 1 min to 1 hour, awakens ~ 3 am, nocturia, can take up to a few hours to fall back to sleep, up 8-11 am. Estimates 5-6 hours sleep. Mild daytime sleepiness (ESS=8- may fall asleep during sedentary conditions), naps 1-2 times per week. Drinks 2 cups coffee, denies ETOH or other substances. She takes Vyvanse, Lexapro 15 mg and Lipitor. Impression: Mild Anxiety Disorder, Delayed Sleep Wake Phase Disorder, Inadequate Sleep Hygiene CBTi and DSPS recommendations: melatonin in divided doses several hours prior to bed, strict SW schedule and morning light exposure. Update: Pt report and sleep logs show more consistent sleep onset, consolidated and improved sleep variables with adherence to CBTi recommendations for DSPS: bedtime routine melatonin, bedtime at 1 am and waketime 8:30-9 am, light exposure upon awakening.

## 2024-04-18 NOTE — PHYSICAL EXAM
[General Appearance - Well Developed] : well developed [Normal Appearance] : normal appearance [Well Groomed] : well groomed [General Appearance - Well Nourished] : well nourished [General Appearance - In No Acute Distress] : no acute distress [Oriented To Time, Place, And Person] : oriented to person, place, and time [Impaired Insight] : insight and judgment were intact [Affect] : the affect was normal [Mood] : the mood was normal

## 2024-05-07 ENCOUNTER — APPOINTMENT (OUTPATIENT)
Dept: PULMONOLOGY | Facility: CLINIC | Age: 69
End: 2024-05-07
Payer: MEDICARE

## 2024-05-07 PROCEDURE — 90837 PSYTX W PT 60 MINUTES: CPT

## 2024-05-09 NOTE — PHYSICAL EXAM
[General Appearance - Well Developed] : well developed [Normal Appearance] : normal appearance [Well Groomed] : well groomed [General Appearance - Well Nourished] : well nourished [General Appearance - In No Acute Distress] : no acute distress [Oriented To Time, Place, And Person] : oriented to person, place, and time [Impaired Insight] : insight and judgment were intact [Affect] : the affect was normal [Mood] : the mood was normal [Memory Recent] : recent memory was not impaired [Memory Remote] : remote memory was not impaired

## 2024-05-09 NOTE — ASSESSMENT
[FreeTextEntry1] : Time: 1:05-1:58 pm Reports that she was doing "great" for 1-2 weeks while recovering from shingles and past week has fallen into a worse pattern- feeling "wired". Finds "AM" and "PM" index reminder cards to be helpful- lists the things she needs to do (e.g., PM- melatonin 10:30 pm, blinds down, put clothes away, skincare regimen, bedtime >12:30 pm) but still resists following schedule sometimes. Her boyfriend helps her stay on a routine. Mild lingering discomfort "itchy" from shingles. She notices sleep quality is better when she stays on a routine. Sleep logs show two weeks of more consistent bedtimes ~12-1 am, SOL 5 mins to 1 hour, 0-2 awakenings, WASO 0-60 mins, waketime 7:30-8:30 am, TST 4.5-7 hrs, ave TST 6.25 hrs. Got out of bed during longer awakening, sleep mask on for morning light. Last week sleep log shows bedtime 1-2 am, SOL 0-hours, 0-3 awakenings, WASO 0-75 mins, final waketime 8-9 am, TST 4-7.25 hrs, ave TST 5.9 hrs.  Discussion regarding drifting of bedtime and waketime later and impact on sleep and daytime functioning. Mood is euthymic, mild anxiety symptoms, continuing with weight watcher program- aware that she needs to increase and routinize exercise. Reinforced CBTi and DSPS best practices- f/u 3-4 wks

## 2024-05-09 NOTE — HISTORY OF PRESENT ILLNESS
[FreeTextEntry1] : Ms. 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 She describes "procrastinating" getting into bed; "there is always something more to do".  As a child, she recalls that her parents "gave up" trying to put her to sleep and she would sleep on her own schedule, often hard to wake and late for school. In college, she often pulled "all-nighters" to get work done and would go grocery shopping at 4 am. 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 Preferred bedtime when younger was 3-4 am but forced to go to bed earlier for school and work schedules. Typical SW schedule: reads, listens to podcasts in bed ~midnight - 1:30 am, sleep latency 1 min to 1 hour, awakens ~ 3 am, nocturia, up to a few hours to fall back to sleep, up 8-11 am. Estimates 5-6 hours sleep. Mild daytime sleepiness (ESS=8- may fall asleep during sedentary conditions), naps 1-2 times per week. Drinks 2-cups coffee, denies ETOH or other substances. She takes Vyvanse, Lexapro 15 mg and Lipitor. Impression: Mild Anxiety Disorder, Delayed Sleep Wake Phase Disorder, Inadequate Sleep Hygiene CBTi and DSPS recommendations: melatonin total 5 mg, in divided doses several hours prior to bed, strict SW schedule and morning light exposure. Update: Pt report and sleep logs show more consistent sleep onset, consolidated and improved sleep variables with adherence to CBTi recommendations for DSPS: bedtime routine melatonin, bedtime at 1 am and waketime 8:30-9 am, light exposure upon awakening.

## 2024-06-06 ENCOUNTER — APPOINTMENT (OUTPATIENT)
Dept: PULMONOLOGY | Facility: CLINIC | Age: 69
End: 2024-06-06
Payer: MEDICARE

## 2024-06-06 PROCEDURE — 90837 PSYTX W PT 60 MINUTES: CPT

## 2024-06-06 NOTE — PHYSICAL EXAM
[General Appearance - Well Developed] : well developed [Normal Appearance] : normal appearance [Well Groomed] : well groomed [General Appearance - Well Nourished] : well nourished [General Appearance - In No Acute Distress] : no acute distress [Oriented To Time, Place, And Person] : oriented to person, place, and time [Impaired Insight] : insight and judgment were intact [Affect] : the affect was normal [Mood] : the mood was normal [FreeTextEntry1] : pleasant demeanor

## 2024-06-06 NOTE — HISTORY OF PRESENT ILLNESS
[FreeTextEntry1] : Ms. 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 She describes "procrastinating" getting into bed; "there is always something more to do". As a child, she recalls that her parents "gave up" trying to put her to sleep and she would sleep on her own schedule, often hard to wake and late for school. In college, she often pulled "all-nighters" to get work done and would go grocery shopping at 4 am. 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 Preferred bedtime when younger was 3-4 am but forced to go to bed earlier for school and work schedules. Typical SW schedule: reads, listens to podcasts in bed ~midnight - 1:30 am, sleep latency 1 min to 1 hour, awakens ~ 3 am, nocturia, up to a few hours to fall back to sleep, up 8-11 am. Estimates 5-6 hours sleep. Mild daytime sleepiness (ESS=8- may fall asleep during sedentary conditions), naps 1-2 times per week. Drinks 2-cups coffee, denies ETOH or other substances. She takes Vyvanse, Lexapro 15 mg and Lipitor. Impression: Mild Anxiety Disorder, Delayed Sleep Wake Phase Disorder, Inadequate Sleep Hygiene CBTi and DSPS recommendations: melatonin total 5 mg, in divided doses several hours prior to bed, strict SW schedule and morning light exposure. Update: Pt report and sleep logs show more consistent sleep onset, consolidated and improved sleep variables with adherence to CBTi recommendations for DSPS: bedtime routine melatonin, bedtime at 1 am and waketime 8:30-9 am, light exposure upon awakening.

## 2024-06-06 NOTE — ASSESSMENT
[FreeTextEntry1] : Time: 1:05-2:00 pm She reports "in general, going better but still different bedtimes"- depends on whether she is staying at her boyfriend's (more consistent ~ 11:30pm-12:30am) or at home (more variable 12:30-2:30 am). At her boyfriend's, he sets an alarm for ~7:10-7:30 am every morning. She is not getting OOB in the middle awakenings- "some kind of block" against it.  She likes tracking her sleep and feels that it keeps her more accountable to a schedule. Describes that she uses to "not be mindful" of schedules and routines.  Reports taking two trips in this time: to visit daughter - got food poisoning and was in bed for a whole day, and [place] to visit a friend in hospice. Was able to go to an early [employer] class- which she has not done in years.  Four weeks of sleep logs show bedtime 11 pm to 2 am, SOL 0 mins to 3 hours, with most nts being <20 mins and 4 nts 1.5-3 hours- suggesting occasional bouts of DIS, 0-1 awakening, WASO 0-90 mins, final waketime 6-10 am, ave wake 8 am, TST 5.7 hrs. Decreased TST are more often associated with later bedtimes. Takes occasional naps ~4:30 pm. Increased daytime exercise.   Validated significant progress: more consistent bedtime routines and bed- and waketimes (12:30-1:30am and 7:30-8:30 am, respectively), only occasional DIS, TST improved to 5.7+ hrs (occasional late bedtimes brings average down). Discussion regarding two process model of sleep, focusin on circadian factors. Reviewed CBTi objectives. f/u 1 mos.

## 2024-07-16 ENCOUNTER — APPOINTMENT (OUTPATIENT)
Dept: PULMONOLOGY | Facility: CLINIC | Age: 69
End: 2024-07-16

## 2024-08-13 ENCOUNTER — APPOINTMENT (OUTPATIENT)
Dept: PULMONOLOGY | Facility: CLINIC | Age: 69
End: 2024-08-13
Payer: MEDICARE

## 2024-08-13 DIAGNOSIS — G47.33 OBSTRUCTIVE SLEEP APNEA (ADULT) (PEDIATRIC): ICD-10-CM

## 2024-08-13 PROCEDURE — 90837 PSYTX W PT 60 MINUTES: CPT

## 2024-08-15 NOTE — HISTORY OF PRESENT ILLNESS
[FreeTextEntry1] : Ms. 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 She describes "procrastinating" getting into bed; "there is always something more to do". As a child, she recalls that her parents "gave up" trying to put her to sleep and she would sleep on her own schedule, often hard to wake and late for school. In college, she often pulled "all-nighters" to get work done and would go grocery shopping at 4 am. 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 Preferred bedtime when younger was 3-4 am but forced to go to bed earlier for school and work schedules. Typical SW schedule: reads, listens to podcasts in bed ~midnight - 1:30 am, sleep latency 1 min to 1 hour, awakens ~ 3 am, nocturia, up to a few hours to fall back to sleep, up 8-11 am. Estimates 5-6 hours sleep. Mild daytime sleepiness (ESS=8- may fall asleep during sedentary conditions), naps 1-2 times per week. Drinks 2-cups coffee, denies ETOH or other substances. She takes Vyvanse, Lexapro 15 mg and Lipitor. Impression: Mild Anxiety Disorder, Delayed Sleep Wake Phase Disorder, Inadequate Sleep Hygiene CBTi and DSPS recommendations: melatonin total 5 mg, in divided doses several hours prior to bed, strict SW schedule and morning light exposure. Update: Pt report and sleep logs show more consistent sleep onset, consolidated and improved sleep variables with adherence to CBTi recommendations for DSPS: bedtime routine melatonin, bedtime at 1 am and waketime 8:30-9 am, light exposure upon awakening.

## 2024-08-15 NOTE — ASSESSMENT
[FreeTextEntry1] : Time: 2:00-2:53 pm Reports that past month has been up and down due to traveling and staying in some uncomfortable places (music dance camp (thin, hard bunk bed) visited daughter and grandson. Notices that staying on a schedule, getting into bed earlier but not too early, exercise, wearing eye shades all help sleep. C/o feeling more tired and joints are stiff in past few weeks- discussed HST to r/o ALEXIS, given sleep lab # and pt will schedule. Notices that she is on a better schedule when she stays with her boyfriend and when she come home, she tends to "actively waste time" by watching TV, snacking and in general is not as mindful of the evenings. Notices she feels better when she gets up by ~7-7:30 am and is able to get to [employer] class.  Compared to before she began at sleep center, she describes "no comparison... getting bigger chunks of sleep".  Six weeks of sleep logs show variable bedtime: 11pm-2 am, SOL 0-10 mins most nts with occasional "hours", 0-2 awakenings, WASO 0-120 mins, final wake 7-9:30 am, TST 3-7, ave TST 5.83 hrs.  Reviewed common pitfalls and habits of pt that sabotage CBTi schedule, f/u 4-5 weeks

## 2024-08-15 NOTE — PHYSICAL EXAM
[General Appearance - Well Developed] : well developed [Normal Appearance] : normal appearance [Well Groomed] : well groomed [General Appearance - Well Nourished] : well nourished [General Appearance - In No Acute Distress] : no acute distress [FreeTextEntry1] : pleasant demeanor. [Oriented To Time, Place, And Person] : oriented to person, place, and time [Impaired Insight] : insight and judgment were intact [Affect] : the affect was normal [Mood] : the mood was normal

## 2024-09-03 ENCOUNTER — APPOINTMENT (OUTPATIENT)
Dept: ORTHOPEDIC SURGERY | Facility: CLINIC | Age: 69
End: 2024-09-03

## 2024-09-12 ENCOUNTER — APPOINTMENT (OUTPATIENT)
Dept: PULMONOLOGY | Facility: CLINIC | Age: 69
End: 2024-09-12
Payer: MEDICARE

## 2024-09-12 PROCEDURE — 90837 PSYTX W PT 60 MINUTES: CPT

## 2024-09-12 NOTE — PHYSICAL EXAM
[General Appearance - Well Developed] : well developed [Normal Appearance] : normal appearance [Well Groomed] : well groomed [General Appearance - Well Nourished] : well nourished [General Appearance - In No Acute Distress] : no acute distress [FreeTextEntry1] : pleasant demeanor, engaging, racing thoughts [Oriented To Time, Place, And Person] : oriented to person, place, and time [Impaired Insight] : insight and judgment were intact [Affect] : the affect was normal [Mood] : the mood was normal

## 2024-09-12 NOTE — ASSESSMENT
[FreeTextEntry1] : Time: 12:07-1:01 pm Reports that sleep schedule has not been good: boyfriend (who keeps patient on a better sleep schedule) was away for 2 1/2 weeks and daughter and grandson visited, and patient slept on uncomfortable couch. She napped more often in living room with eye shades for 1-1.5 hours, one time fell asleep at 11pm for 3 hours and couldn't fall asleep till 4 am. Discussed how irregular SW schedule, sleep deprivation and napping all contribute to poor nighttime sleep.  Two weeks of sleep logs show bedtime 12-2 am, SOL 0-45 mins (not counting two nights of falling asleep in LR with TV ~ 11 pm), 0-3 awakenings, WASO 0-60 mins, final waketime 7-10 am, ave wake ~7:30-8:30 am, TST 3-8 hours, ave TST 5.5 hours.  Pt understands how disrupted schedule affects sleep and brings out propensity to delay falling asleep. Pt concerned about upcoming weekend trips for family reunion and visiting daughter for Preggers holiday. Discussed pt's childhood family dynamics  - pt is youngest, two fraternal twin sisters ~5 years older, mother (complicated relationship- "conditional love"), father (working long hours as physician). Pt worked for her father as  and  in his practice and research, to get closer to him.   Reviewed common CBTi and DSPS recommendations and habits of pt that sabotage CBTi schedule, f/u 3 weeks.

## 2024-09-12 NOTE — HISTORY OF PRESENT ILLNESS
[FreeTextEntry1] : Ms. 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 She describes "procrastinating" getting into bed; "there is always something more to do". As a child, she recalls that her parents "gave up" trying to put her to sleep and she would sleep on her own schedule, often hard to wake and late for school. In college, she often pulled "all-nighters" to get work done and would go grocery shopping at 4 am. 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 Preferred bedtime when younger was 3-4 am but forced to go to bed earlier for school and work schedules. Typical SW schedule: reads, listens to podcasts in bed ~midnight - 1:30 am, sleep latency 1 min to 1 hour, awakens ~ 3 am, nocturia, up to a few hours to fall back to sleep, up 8-11 am. Estimates 5-6 hours sleep. Mild daytime sleepiness (ESS=8- may fall asleep during sedentary conditions), naps 1-2 times per week. Drinks 2-cups coffee, denies ETOH or other substances. She takes Vyvanse (ADD), Lexapro 15 mg and Lipitor. Impression: Mild Anxiety Disorder, Delayed Sleep Wake Phase Disorder, Inadequate Sleep Hygiene CBTi and DSPS recommendations: melatonin total 5 mg, in divided doses several hours prior to bed, strict SW schedule and morning light exposure. Update: Pt report and sleep logs show more consistent sleep onset, consolidated and improved sleep variables with adherence to CBTi recommendations for DSPS: bedtime routine melatonin, bedtime at 1 am and waketime 8:30-9 am, light exposure upon awakening.

## 2024-09-17 ENCOUNTER — APPOINTMENT (OUTPATIENT)
Dept: ORTHOPEDIC SURGERY | Facility: CLINIC | Age: 69
End: 2024-09-17
Payer: MEDICARE

## 2024-09-17 VITALS
SYSTOLIC BLOOD PRESSURE: 154 MMHG | WEIGHT: 170 LBS | HEIGHT: 62 IN | HEART RATE: 93 BPM | DIASTOLIC BLOOD PRESSURE: 93 MMHG | BODY MASS INDEX: 31.28 KG/M2

## 2024-09-17 DIAGNOSIS — M51.37 OTHER INTERVERTEBRAL DISC DEGENERATION, LUMBOSACRAL REGION: ICD-10-CM

## 2024-09-17 DIAGNOSIS — Z96.641 PRESENCE OF RIGHT ARTIFICIAL HIP JOINT: ICD-10-CM

## 2024-09-17 DIAGNOSIS — M22.41 CHONDROMALACIA PATELLAE, RIGHT KNEE: ICD-10-CM

## 2024-09-17 DIAGNOSIS — Z96.642 PRESENCE OF LEFT ARTIFICIAL HIP JOINT: ICD-10-CM

## 2024-09-17 DIAGNOSIS — M22.42 CHONDROMALACIA PATELLAE, LEFT KNEE: ICD-10-CM

## 2024-09-17 PROCEDURE — 73564 X-RAY EXAM KNEE 4 OR MORE: CPT | Mod: 50

## 2024-09-17 PROCEDURE — 73522 X-RAY EXAM HIPS BI 3-4 VIEWS: CPT

## 2024-09-17 PROCEDURE — 99213 OFFICE O/P EST LOW 20 MIN: CPT

## 2024-09-17 NOTE — HISTORY OF PRESENT ILLNESS
[de-identified] : Patient is a 67 y/o female s/p R THR 2007 and L THR 2008 which are doing very well. She has no pain or complaints regarding her hips. She also has complaints of both her knees which they feel a little "spongy" to her. She denies any injury or trauma. She states the pain is a 0/10 at rest but increases with getting out of a chair. She does not take any pain meds.

## 2024-09-17 NOTE — DISCUSSION/SUMMARY
[de-identified] : Patient's total hip replacements continue to do extremely well and are functioning well with excellent motion.  Her back does show degenerative changes at L5-S1 but she is managed well on conservative measures and her right knee is giving her some discomfort at the patellofemoral joint as well as at the medial joint line.  With her fall she may have sustained a tear of her medial meniscus however her symptoms are not enough at this time to warrant further treatment other than conservative measures and an oral anti-inflammatory agent of which she likes ibuprofen.  If her knee pain increases we will get an MRI.  She is in agreement with the plan and will return in 3 months if necessary.

## 2024-09-17 NOTE — PHYSICAL EXAM
[de-identified] : She continues to do extremely well as far as her bilateral hip replacements.  She does have bilateral Biomet metal-on-metal magnum total hip replacements.  She does exercise class she is extremely active.  She does get some mild back pain intermittently but is doing satisfactory and conservative back measures.   her deep tendon reflexes motor and sensory remain symmetric.     Circulation appears satisfactory with pedal pulses present.  There is no major edema in the lower legs. No skin tenderness or increased temperature. No major varicosities.  HIP EXAMINATION the abduction and abduction as well as rotation measurements were taken with the hip in flexion. She has bilateral Biomet Magnum and to a metal-on-metal total hip replacements.  She has had no pain in her hips. There is symmetric motion with flexion 135 degrees, abduction 80 degrees, adduction 30 degrees, external rotation 80 degrees, internal rotation 20 degrees.  There is no evidence of any pain around her groin she has good strength across her hips there is no crepitus in either hip the alignment of her hips is symmetric and normal.    She is having some pain in the anterior aspect of her knees when going up and down stairs and getting up from sitting position.  Her motion in her knees goes from 0 to 135 degrees bilaterally she has good medial lateral and anterior posterior stability no pain on the over the medial or lateral joint line her tenderness is patellofemoral in both knees.  It is not severe.  Spine - deep tendon reflexes are symmetric. Motor and sensory are symmetric.  She has good muscle strength in the ankles.  No radicular symptoms at this time she has some mild low back discomfort.   [de-identified] : AP of the pelvis and lateral of the right left hip show bilateral Biomet M2a metal-on-metal total hip replacements.  The position in both is excellent there is no evidence of any bone loss or change in position or osteolysis.   4 views of the right left knee show normal alignment on the AP view medial lateral joint spaces are maintained on the standing as well as Hunter views lateral views are normal the skyline views do show decreased part cartilage space bilaterally at the patellofemoral joint but the patellar tracking satisfactorily.  Impression early bilateral patellofemoral chondromalacia or arthritis.

## 2024-10-01 ENCOUNTER — APPOINTMENT (OUTPATIENT)
Dept: PULMONOLOGY | Facility: CLINIC | Age: 69
End: 2024-10-01

## 2024-10-24 ENCOUNTER — APPOINTMENT (OUTPATIENT)
Dept: PULMONOLOGY | Facility: CLINIC | Age: 69
End: 2024-10-24
Payer: MEDICARE

## 2024-10-24 PROCEDURE — 90837 PSYTX W PT 60 MINUTES: CPT

## 2024-11-14 ENCOUNTER — OUTPATIENT (OUTPATIENT)
Dept: OUTPATIENT SERVICES | Facility: HOSPITAL | Age: 69
LOS: 1 days | End: 2024-11-14
Payer: MEDICARE

## 2024-11-14 ENCOUNTER — APPOINTMENT (OUTPATIENT)
Dept: SLEEP CENTER | Facility: CLINIC | Age: 69
End: 2024-11-14
Payer: MEDICARE

## 2024-11-14 PROCEDURE — 95800 SLP STDY UNATTENDED: CPT

## 2024-11-14 PROCEDURE — 95800 SLP STDY UNATTENDED: CPT | Mod: 26

## 2024-11-19 DIAGNOSIS — G47.33 OBSTRUCTIVE SLEEP APNEA (ADULT) (PEDIATRIC): ICD-10-CM

## 2024-11-26 ENCOUNTER — APPOINTMENT (OUTPATIENT)
Dept: PULMONOLOGY | Facility: CLINIC | Age: 69
End: 2024-11-26
Payer: MEDICARE

## 2024-11-26 PROCEDURE — 90837 PSYTX W PT 60 MINUTES: CPT

## 2024-12-27 ENCOUNTER — NON-APPOINTMENT (OUTPATIENT)
Age: 69
End: 2024-12-27

## 2025-01-07 ENCOUNTER — APPOINTMENT (OUTPATIENT)
Dept: PULMONOLOGY | Facility: CLINIC | Age: 70
End: 2025-01-07
Payer: MEDICARE

## 2025-01-07 PROCEDURE — 90837 PSYTX W PT 60 MINUTES: CPT

## 2025-02-19 ENCOUNTER — APPOINTMENT (OUTPATIENT)
Dept: ORTHOPEDIC SURGERY | Facility: CLINIC | Age: 70
End: 2025-02-19
Payer: MEDICARE

## 2025-02-19 DIAGNOSIS — Z96.642 PRESENCE OF LEFT ARTIFICIAL HIP JOINT: ICD-10-CM

## 2025-02-19 DIAGNOSIS — M77.01 MEDIAL EPICONDYLITIS, RIGHT ELBOW: ICD-10-CM

## 2025-02-19 DIAGNOSIS — M17.11 UNILATERAL PRIMARY OSTEOARTHRITIS, RIGHT KNEE: ICD-10-CM

## 2025-02-19 PROCEDURE — 20610 DRAIN/INJ JOINT/BURSA W/O US: CPT | Mod: RT

## 2025-02-19 PROCEDURE — 99214 OFFICE O/P EST MOD 30 MIN: CPT | Mod: 25

## 2025-02-19 PROCEDURE — 20605 DRAIN/INJ JOINT/BURSA W/O US: CPT | Mod: 59,RT

## 2025-02-19 RX ORDER — CELECOXIB 200 MG/1
200 CAPSULE ORAL DAILY
Qty: 60 | Refills: 2 | Status: ACTIVE | COMMUNITY
Start: 2025-02-19 | End: 1900-01-01

## 2025-02-25 ENCOUNTER — APPOINTMENT (OUTPATIENT)
Dept: PULMONOLOGY | Facility: CLINIC | Age: 70
End: 2025-02-25

## 2025-03-06 ENCOUNTER — APPOINTMENT (OUTPATIENT)
Dept: PULMONOLOGY | Facility: CLINIC | Age: 70
End: 2025-03-06
Payer: MEDICARE

## 2025-03-06 PROCEDURE — 90837 PSYTX W PT 60 MINUTES: CPT

## 2025-03-31 ENCOUNTER — NON-APPOINTMENT (OUTPATIENT)
Age: 70
End: 2025-03-31

## 2025-03-31 ENCOUNTER — APPOINTMENT (OUTPATIENT)
Dept: OPHTHALMOLOGY | Facility: CLINIC | Age: 70
End: 2025-03-31
Payer: COMMERCIAL

## 2025-03-31 PROCEDURE — 92014 COMPRE OPH EXAM EST PT 1/>: CPT

## 2025-04-24 ENCOUNTER — APPOINTMENT (OUTPATIENT)
Dept: PULMONOLOGY | Facility: CLINIC | Age: 70
End: 2025-04-24

## 2025-05-14 ENCOUNTER — APPOINTMENT (OUTPATIENT)
Dept: PULMONOLOGY | Facility: CLINIC | Age: 70
End: 2025-05-14
Payer: MEDICARE

## 2025-05-14 PROCEDURE — 90837 PSYTX W PT 60 MINUTES: CPT | Mod: 2W

## 2025-07-08 ENCOUNTER — APPOINTMENT (OUTPATIENT)
Dept: PULMONOLOGY | Facility: CLINIC | Age: 70
End: 2025-07-08

## 2025-08-12 ENCOUNTER — APPOINTMENT (OUTPATIENT)
Dept: PULMONOLOGY | Facility: CLINIC | Age: 70
End: 2025-08-12
Payer: MEDICARE

## 2025-08-12 PROCEDURE — 90837 PSYTX W PT 60 MINUTES: CPT
